# Patient Record
Sex: MALE | Race: BLACK OR AFRICAN AMERICAN | URBAN - METROPOLITAN AREA
[De-identification: names, ages, dates, MRNs, and addresses within clinical notes are randomized per-mention and may not be internally consistent; named-entity substitution may affect disease eponyms.]

---

## 2018-03-09 ENCOUNTER — EMERGENCY (EMERGENCY)
Facility: HOSPITAL | Age: 43
LOS: 1 days | Discharge: HOME | End: 2018-03-09
Attending: EMERGENCY MEDICINE | Admitting: EMERGENCY MEDICINE
Payer: MEDICAID

## 2018-03-09 VITALS
OXYGEN SATURATION: 100 % | DIASTOLIC BLOOD PRESSURE: 77 MMHG | TEMPERATURE: 98 F | HEART RATE: 84 BPM | RESPIRATION RATE: 18 BRPM | SYSTOLIC BLOOD PRESSURE: 134 MMHG

## 2018-03-09 DIAGNOSIS — Z88.0 ALLERGY STATUS TO PENICILLIN: ICD-10-CM

## 2018-03-09 DIAGNOSIS — N34.2 OTHER URETHRITIS: ICD-10-CM

## 2018-03-09 DIAGNOSIS — R36.9 URETHRAL DISCHARGE, UNSPECIFIED: ICD-10-CM

## 2018-03-09 LAB — HIV 1 & 2 AB SERPL IA.RAPID: SIGNIFICANT CHANGE UP

## 2018-03-09 PROCEDURE — 99284 EMERGENCY DEPT VISIT MOD MDM: CPT

## 2018-03-09 RX ORDER — AZITHROMYCIN 500 MG/1
2000 TABLET, FILM COATED ORAL ONCE
Qty: 0 | Refills: 0 | Status: COMPLETED | OUTPATIENT
Start: 2018-03-09 | End: 2018-03-09

## 2018-03-09 RX ADMIN — AZITHROMYCIN 2000 MILLIGRAM(S): 500 TABLET, FILM COATED ORAL at 18:01

## 2018-03-09 NOTE — ED PROVIDER NOTE - OBJECTIVE STATEMENT
41 y/o male presents to the ED wt c/o penile discharge and dysuria. Pt explains sx began today . Denies F/C, lesions, ulcers, itching. Has been sexually active with one person and engaged in intercourse last week. Allergic to penicillin. 43 y/o male presents to the ED wt c/o penile discharge and dysuria. Pt explains sx began today . Denies F/C, lesions, and itching. Has been sexually active with one person and engaged in intercourse last week. Allergic to penicillin. 41 y/o male presents to the ED wt c/o penile discharge and dysuria. Pt explains sx began today . Denies F/C, lesions, and itching. Has been sexually active with one person and engaged in intercourse last week. Allergic to penicillin.    Pt consents to HIV and syphilis testing but does not currently have a phone.  reports he has an email that he checks actively - [email or web address].  He also reports he will return Monday to f/u his results. 43 y/o male presents to the ED with c/o penile discharge and dysuria. Pt explains sx began today . Denies F/C, lesions, and itching. Has been sexually active with one person and engaged in intercourse last week. Allergic to penicillin.    Pt consents to HIV and syphilis testing but does not currently have a phone.  reports he has an email that he checks actively - [email or web address].  He also reports he will return Monday to f/u his results.

## 2018-03-09 NOTE — ED PROVIDER NOTE - MEDICAL DECISION MAKING DETAILS
Pt with likely STI.  Given PCN allergy will treat with 2 grams of azithromycin.  Pt knows to f/u his results.  Will abstain from sexual intercourse x 1 week.  He knows sxs should start to resolve within 2-3 days and to return if they do not return.

## 2018-03-10 LAB
C TRACH RRNA SPEC QL NAA+PROBE: SIGNIFICANT CHANGE UP
N GONORRHOEA RRNA SPEC QL NAA+PROBE: DETECTED
SPECIMEN SOURCE: SIGNIFICANT CHANGE UP
T PALLIDUM AB TITR SER: NEGATIVE — SIGNIFICANT CHANGE UP

## 2018-03-11 NOTE — ED POST DISCHARGE NOTE - OTHER COMMUNICATION
Emailed Luciana Salter requesting that a certified letter be sent to patient. [place] [employer] submitted electronically, # [id number]

## 2018-03-12 ENCOUNTER — EMERGENCY (EMERGENCY)
Facility: HOSPITAL | Age: 43
LOS: 1 days | Discharge: HOME | End: 2018-03-12
Attending: EMERGENCY MEDICINE | Admitting: EMERGENCY MEDICINE
Payer: MEDICAID

## 2018-03-12 VITALS
RESPIRATION RATE: 16 BRPM | TEMPERATURE: 98 F | SYSTOLIC BLOOD PRESSURE: 133 MMHG | WEIGHT: 149.91 LBS | HEART RATE: 82 BPM | DIASTOLIC BLOOD PRESSURE: 82 MMHG

## 2018-03-12 DIAGNOSIS — Z88.0 ALLERGY STATUS TO PENICILLIN: ICD-10-CM

## 2018-03-12 DIAGNOSIS — Z11.9 ENCOUNTER FOR SCREENING FOR INFECTIOUS AND PARASITIC DISEASES, UNSPECIFIED: ICD-10-CM

## 2018-03-12 PROCEDURE — 99282 EMERGENCY DEPT VISIT SF MDM: CPT

## 2018-03-12 NOTE — ED ADULT TRIAGE NOTE - CHIEF COMPLAINT QUOTE
Patient presents to ED for lab result value. Was told by MD Michel to follow up with him on Monday to speak about blood results. Denies any medical symptoms.

## 2018-03-12 NOTE — ED PROVIDER NOTE - MEDICAL DECISION MAKING DETAILS
Pt appears medically stable at this time.  Urethritis sxs resolved.  Pt demonstrates full understanding of recent dx of gonorrhea.  Medically cleared as UA findings c/w gonorrhea.  CBC findings are nonspecific as there is no sly anemia at this time.  Recommend repeat of tests in 6 months, sooner if he develops any weakness, CP, dizziness, or other concerning sxs.

## 2018-03-12 NOTE — ED PROVIDER NOTE - OBJECTIVE STATEMENT
Pt is a 41 yo M with no PMH who was recently seen here for urethritis and dx with gonorrhea after intercourse with female partner.  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  He recently had blood work for this clearance and notes abnormal UA given recent urethritis.  Also noted incidental mild low MCV, MCH, and MCHC on his CBC but with normal H/H and normal plts/WBC.  Pt is without any sxs.  Reports dysuria and urethral d/c have completely cleared.  Denies any fevers, chills, HA, dizziness, weakness, MOSCOSO, SOB, CP, abd pain, N/V/D, LE edema, or any other concerns.  Pt is requesting medical clearance.
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
